# Patient Record
Sex: MALE | Race: WHITE | URBAN - METROPOLITAN AREA
[De-identification: names, ages, dates, MRNs, and addresses within clinical notes are randomized per-mention and may not be internally consistent; named-entity substitution may affect disease eponyms.]

---

## 2018-05-03 ENCOUNTER — HOSPITAL ENCOUNTER (EMERGENCY)
Facility: CLINIC | Age: 23
Discharge: HOME | End: 2018-05-03
Attending: EMERGENCY MEDICINE | Admitting: EMERGENCY MEDICINE
Payer: COMMERCIAL

## 2018-05-03 VITALS
RESPIRATION RATE: 18 BRPM | WEIGHT: 150 LBS | DIASTOLIC BLOOD PRESSURE: 85 MMHG | TEMPERATURE: 97.6 F | BODY MASS INDEX: 23.54 KG/M2 | SYSTOLIC BLOOD PRESSURE: 122 MMHG | HEIGHT: 67 IN | OXYGEN SATURATION: 95 %

## 2018-05-03 DIAGNOSIS — H00.014 HORDEOLUM OF LEFT UPPER EYELID, UNSPECIFIED HORDEOLUM TYPE: ICD-10-CM

## 2018-05-03 PROCEDURE — 99282 EMERGENCY DEPT VISIT SF MDM: CPT

## 2018-05-03 RX ORDER — DOXYCYCLINE 100 MG/1
100 CAPSULE ORAL 2 TIMES DAILY
Qty: 20 CAPSULE | Refills: 0 | Status: SHIPPED | OUTPATIENT
Start: 2018-05-03

## 2018-05-03 ASSESSMENT — ENCOUNTER SYMPTOMS
FEVER: 0
EYE REDNESS: 0
PHOTOPHOBIA: 0
HEADACHES: 0
FACIAL SWELLING: 1
EYE PAIN: 1

## 2018-05-03 ASSESSMENT — VISUAL ACUITY
OS: 20/60
OD: 20/40

## 2018-05-03 NOTE — ED AVS SNAPSHOT
Emergency Department    [address] [place] [id number]    Phone:  [phone]    Fax:  [phone]                                       Carlos Ramachandran   MRN: [id number]    Department:   Emergency Department   Date of Visit:  5/3/2018           After Visit Summary Signature Page     I have received my discharge instructions, and my questions have been answered. I have discussed any challenges I see with this plan with the nurse or doctor.    ..........................................................................................................................................  Patient/Patient Representative Signature      ..........................................................................................................................................  Patient Representative Print Name and Relationship to Patient    ..................................................               ................................................  Date                                            Time    ..........................................................................................................................................  Reviewed by Signature/Title    ...................................................              ..............................................  Date                                                            Time

## 2018-05-03 NOTE — DISCHARGE INSTRUCTIONS
Sty (or Stye)  A sty is an infection of the oil gland of the eyelid. It may develop into a small pocket of pus (an abscess). This can cause pain, redness, and swelling. In early stages, a sty is treated with antibiotic cream, eye drops, or a small towel soaked in warm water (a warm compress). More severe cases may need to be opened and drained by a healthcare provider.  Home care    Eye drops or ointment are usually prescribed to treat the infection. Use these as directed.     Artificial tears may also be used to lubricate the eye and make it more comfortable. You can buy these over the counter without a prescription. Talk with your healthcare provider before using any over-the-counter treatment for a sty.    Apply a warm, damp towel to the affected eye for at least 5 minutes, 3 to 4 times a day for a week. Warm compresses open the pores and speed the healing. But if the compresses are too hot, they may burn your eyelid.    Sometimes the sty will drain with this treatment alone. If this happens, keep using the antibiotic until all the redness and swelling are gone.    Wash your hands before and after touching the infected eyelid to avoid spreading the infection.    Don t squeeze or try to break open the sty.  Follow-up care  Follow up with your healthcare provider, or as advised.   When to seek medical advice  Call your healthcare provider right away if any of these occur:    Increase in swelling or redness around the eyelid after 48 to 72 hours    Increase in eye pain or the eyelid blisters    Increase in warmth--the eyelid feels hot    Drainage of blood or thick pus from the sty    Blister on the eyelid    Inability to open the eyelid due to swelling    Fever of 100.4 F (38 C) or above, or as directed by your provider    Vision changes    Headache or stiff neck    The sty comes back  Date Last Reviewed: 8/1/2017 2000-2017 [employer]. [address] [place] [address], [place] [address]. All rights  reserved. This information is not intended as a substitute for professional medical care. Always follow your healthcare professional's instructions.

## 2018-05-03 NOTE — ED PROVIDER NOTES
"  History     Chief Complaint:  Left eye swelling and pain    HPI   Carlos Ramachandran is a 23 year old male who presents with left eye swelling. The patient states that approximately 3 days ago he developed some mild pain in his left superior eye and eyelid. He has had styes in the past and thought one was developing so he applied warm compresses. The next day the pain continued and that evening he applied eye drops. Since 1800 last night, he notes that his eye began swelling and this has progressed into this morning prompting him to come here for evaluation. He has no vision loss in the eye but notes that moving the eye will provoke pain. He notes that his eyelid seems to be the source of swelling.  He tried warm compresses without much relief. He denies fevers, vision changes, diplopia, or photophobia.    Allergies:  No known drug allergies     Medications:    The patient is currently on no regular medications.      Past Medical History:    ODD    Past Surgical History:    ACL repair     Family History:    History reviewed. No pertinent family history.      Social History:  Smoking Status: Former Smoker  Alcohol Use: no  Patient presents alone.   Marital Status:  Single      Review of Systems   Constitutional: Negative for fever.   HENT: Positive for facial swelling.    Eyes: Positive for pain. Negative for photophobia, redness and visual disturbance.   Neurological: Negative for headaches.       Physical Exam   First Vitals:  BP: 119/75  Heart Rate: 61  Temp: 97.6  F (36.4  C)  Resp: 16  Height: 170.2 cm (5' 7\")  Weight: 68 kg (150 lb)  SpO2: 100 %  Visual Acuity-Left: 20/60  Visual Acuity-Right: 20/40    Physical Exam  Constitutional: White male. Sitting.   Eyes: Swelling of left upper lid. No redness. Nodular lesions palpated to the lateral aspect. Nothing visual at base of hair follicles or on palpebral conjunctiva. No discharge. PERRL. EOMI. Anterior chamber clear. No foreign bodies seen.  Neuro: Awake, " alert, appropriate.    Emergency Department Course     Past medical records, nursing notes, and vitals reviewed.  1454: I performed an exam of the patient as documented above. Clinical findings and plan explained to the Patient. Patient discharged home with instructions regarding supportive care, medications, and reasons to return as well as the importance of close follow-up were reviewed.      Impression & Plan      Medical Decision Making:  Carlos Ramachandran is a 23 year old male who presents with left eye swelling. It is mostly his eyelid. This has been present for two days. He has a history of styes and thought he felt some bumps in his eyelid. He has no fever or chills. There is no discharge or redness. The patient denies any vision problems or pain, or light sensitivity. On exam, the left upper lid is swollen. I can feel some nodular lesions on the lateral aspect. There is minimal redness, no discharge, and anterior chamber is clear. There is no foreign body. This is consistent with an internal stye. I have started on Doxycycline and recommended warm compresses and referred him to Dr. Hartman on Monday if not better.    Plan:  As noted.    Diagnosis:    ICD-10-CM    1. Hordeolum of left upper eyelid, unspecified hordeolum type H00.014      Discharge Medications:   Details   doxycycline (VIBRAMYCIN) 100 MG capsule Take 1 capsule (100 mg) by mouth 2 times daily, Disp-20 capsule, R-0, Local Print     [facility] Deutsch  5/3/2018    EMERGENCY DEPARTMENT  K[facility] HAN, am serving as a scribe at 2:53 PM on 5/3/2018 to document services personally performed by Dave Baker MD based on my observations and the provider's statements to me.       Dave Baker MD  05/03/18 [id number]

## 2021-11-05 ENCOUNTER — TELEPHONE (OUTPATIENT)
Dept: PHYSICAL THERAPY | Facility: CLINIC | Age: 26
End: 2021-11-05